# Patient Record
Sex: MALE | URBAN - METROPOLITAN AREA
[De-identification: names, ages, dates, MRNs, and addresses within clinical notes are randomized per-mention and may not be internally consistent; named-entity substitution may affect disease eponyms.]

---

## 2023-08-02 ENCOUNTER — WEB ENCOUNTER (OUTPATIENT)
Dept: URBAN - METROPOLITAN AREA CLINIC 60 | Facility: CLINIC | Age: 53
End: 2023-08-02

## 2023-08-02 ENCOUNTER — OFFICE VISIT (OUTPATIENT)
Dept: URBAN - METROPOLITAN AREA CLINIC 60 | Facility: CLINIC | Age: 53
End: 2023-08-02
Payer: COMMERCIAL

## 2023-08-02 ENCOUNTER — DASHBOARD ENCOUNTERS (OUTPATIENT)
Age: 53
End: 2023-08-02

## 2023-08-02 ENCOUNTER — LAB OUTSIDE AN ENCOUNTER (OUTPATIENT)
Dept: URBAN - METROPOLITAN AREA CLINIC 60 | Facility: CLINIC | Age: 53
End: 2023-08-02

## 2023-08-02 VITALS
BODY MASS INDEX: 26.78 KG/M2 | OXYGEN SATURATION: 98 % | DIASTOLIC BLOOD PRESSURE: 74 MMHG | TEMPERATURE: 98.9 F | WEIGHT: 180.8 LBS | RESPIRATION RATE: 12 BRPM | HEART RATE: 74 BPM | HEIGHT: 69 IN | SYSTOLIC BLOOD PRESSURE: 122 MMHG

## 2023-08-02 DIAGNOSIS — Z12.11 SCREENING FOR COLON CANCER: ICD-10-CM

## 2023-08-02 DIAGNOSIS — K60.2 ANAL FISSURE: ICD-10-CM

## 2023-08-02 PROCEDURE — 99203 OFFICE O/P NEW LOW 30 MIN: CPT | Performed by: INTERNAL MEDICINE

## 2023-08-02 RX ORDER — POLYETHYLENE GLYCOL 3350, SODIUM CHLORIDE, SODIUM BICARBONATE, POTASSIUM CHLORIDE 420; 11.2; 5.72; 1.48 G/4L; G/4L; G/4L; G/4L
AS DIRECTED POWDER, FOR SOLUTION ORAL ONCE
Qty: 4000 | Refills: 0 | OUTPATIENT
Start: 2023-08-02 | End: 2023-08-03

## 2023-08-02 NOTE — PHYSICAL EXAM MUSCULOSKELETAL:
normal gait and station , no tenderness or deformities present Take your medications exactly as prescribed. Having your pain under control will help increase activity, improve deep breathing and coughing and prevent complications like pneumonia and blood clots in your legs. Some of the common side effects of pain medications are nausea, vomiting, itching, rash and upset stomach. Contact your doctor if you develop these or any other unusual systoms. Eat a diet rich in fiber and drink plenty of oral fluids. Use other pain management methods like, cold and warm applications, elevation of affected body part, listening to music, watching TV, yoga, etc.Do not take any other medications unless approved by your doctor. Do not drive, operate machinery, drink alcohol, or make any important decisions while taking narcotic pain medications. Store medication in its original bottle, in a locked cabinet away from the reach of children. Dispose of any unused and  medication safely.

## 2023-08-02 NOTE — HPI-TODAY'S VISIT:
Detail Level: Detailed Preston is a 53 y/o male that presents as a new patient. He was having anal pain and was diagnosed with anal fissure aobut 3 weeks ago. He was treated with topical thearpy which he He was able to get through a compounding pharmacy. He has had improvement in his anal pain since that time.  He says that he was also started on Miralax at that time to keep his stools softer. He is moving his bowels regularly. He says that his stools have been softer with the Miralax and he has not had to strain. Denies blood in the stool. Denies anal or rectal pain at this time. He has not had a colonoscopy. Denies family history of colon cancer. Denies having any other medical problems. Add 69386 Cpt? (Important Note: In 2017 The Use Of 71753 Is Being Tracked By Cms To Determine Future Global Period Reimbursement For Global Periods): yes Wound Evaluated By (Optional): Denzel Wound Diameter In Cm(Optional): 0 Sutures?: intact

## 2023-08-21 ENCOUNTER — TELEPHONE ENCOUNTER (OUTPATIENT)
Dept: URBAN - METROPOLITAN AREA CLINIC 60 | Facility: CLINIC | Age: 53
End: 2023-08-21

## 2023-08-22 ENCOUNTER — WEB ENCOUNTER (OUTPATIENT)
Dept: URBAN - METROPOLITAN AREA SURGERY CENTER 4 | Facility: SURGERY CENTER | Age: 53
End: 2023-08-22

## 2023-08-25 ENCOUNTER — CLAIMS CREATED FROM THE CLAIM WINDOW (OUTPATIENT)
Dept: URBAN - METROPOLITAN AREA CLINIC 4 | Facility: CLINIC | Age: 53
End: 2023-08-25
Payer: COMMERCIAL

## 2023-08-25 ENCOUNTER — OUT OF OFFICE VISIT (OUTPATIENT)
Dept: URBAN - METROPOLITAN AREA SURGERY CENTER 4 | Facility: SURGERY CENTER | Age: 53
End: 2023-08-25
Payer: COMMERCIAL

## 2023-08-25 DIAGNOSIS — Z12.11 SCREENING FOR COLON CANCER: ICD-10-CM

## 2023-08-25 DIAGNOSIS — K64.0 INTERNAL HEMORRHOIDS GRADE I: ICD-10-CM

## 2023-08-25 DIAGNOSIS — D12.3 BENIGN NEOPLASM OF TRANSVERSE COLON: ICD-10-CM

## 2023-08-25 DIAGNOSIS — D12.2 BENIGN NEOPLASM OF ASCENDING COLON: ICD-10-CM

## 2023-08-25 DIAGNOSIS — K63.5 BENIGN COLON POLYPS: ICD-10-CM

## 2023-08-25 DIAGNOSIS — K64.0 GRADE I HEMORRHOIDS: ICD-10-CM

## 2023-08-25 DIAGNOSIS — Z12.11 COLON CANCER SCREENING (HIGH RISK): ICD-10-CM

## 2023-08-25 PROCEDURE — 00811 ANES LWR INTST NDSC NOS: CPT | Performed by: NURSE ANESTHETIST, CERTIFIED REGISTERED

## 2023-08-25 PROCEDURE — 45380 COLONOSCOPY AND BIOPSY: CPT | Performed by: INTERNAL MEDICINE

## 2023-08-25 PROCEDURE — 45385 COLONOSCOPY W/LESION REMOVAL: CPT | Performed by: INTERNAL MEDICINE

## 2023-08-25 PROCEDURE — 88305 TISSUE EXAM BY PATHOLOGIST: CPT | Performed by: PATHOLOGY
